# Patient Record
Sex: MALE | Race: WHITE | URBAN - METROPOLITAN AREA
[De-identification: names, ages, dates, MRNs, and addresses within clinical notes are randomized per-mention and may not be internally consistent; named-entity substitution may affect disease eponyms.]

---

## 2017-10-11 ENCOUNTER — TELEPHONE (OUTPATIENT)
Dept: LAB | Facility: CLINIC | Age: 72
End: 2017-10-11

## 2017-10-11 ENCOUNTER — OFFICE VISIT (OUTPATIENT)
Dept: FAMILY MEDICINE | Facility: CLINIC | Age: 72
End: 2017-10-11
Payer: COMMERCIAL

## 2017-10-11 VITALS
SYSTOLIC BLOOD PRESSURE: 160 MMHG | WEIGHT: 205 LBS | RESPIRATION RATE: 18 BRPM | HEART RATE: 96 BPM | BODY MASS INDEX: 29.35 KG/M2 | DIASTOLIC BLOOD PRESSURE: 76 MMHG | HEIGHT: 70 IN | TEMPERATURE: 98.5 F | OXYGEN SATURATION: 96 %

## 2017-10-11 DIAGNOSIS — M54.50 ACUTE LEFT-SIDED LOW BACK PAIN WITHOUT SCIATICA: Primary | ICD-10-CM

## 2017-10-11 DIAGNOSIS — I49.9 IRREGULAR HEART BEAT: ICD-10-CM

## 2017-10-11 PROCEDURE — 99213 OFFICE O/P EST LOW 20 MIN: CPT | Performed by: NURSE PRACTITIONER

## 2017-10-11 PROCEDURE — 93000 ELECTROCARDIOGRAM COMPLETE: CPT | Performed by: NURSE PRACTITIONER

## 2017-10-11 ASSESSMENT — PAIN SCALES - GENERAL: PAINLEVEL: NO PAIN (1)
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
## 2017-10-11 NOTE — MR AVS SNAPSHOT
After Visit Summary   10/11/2017    Eugenio Carroll    MRN: [id number]           Patient Information     Date Of Birth          1945        Visit Information        Provider Department      10/11/2017 1:40 PM Janay Cason NP [facility]        Today's Diagnoses     Irregular heart beat    -  1    Screen for colon cancer        Need for hepatitis C screening test        At risk for falling        Acute left-sided low back pain without sciatica          Care Instructions    Try topical Bengay or IcyHot 1-2x/days or few times a week  EKG was normal  We will call if labs are abnormal, otherwise will send a letter for normal labs.   Shoulder and Upper Back Stretch  To start, stand tall with your ears, shoulders, and hips in line. Your feet should be slightly apart, positioned just under your hips. Focus your eyes directly in front of you.  this position for a few seconds before starting your exercise. This helps increase your awareness of proper posture.          Reach overhead and slightly back with both arms. Keep your shoulders and neck aligned and your elbows behind your shoulders:    With your palms facing the ceiling, turn your fingers inward.    Take a deep breath. Breathe out, and lower your elbows toward your buttocks. Hold for 5 seconds, then return to starting position.    Repeat 3 times.  Date Last Reviewed: 8/16/2015 2000-2017 [employer]. [address] [place] [address], [place] [address]. All rights reserved. This information is not intended as a substitute for professional medical care. Always follow your healthcare professional's instructions.                Follow-ups after your visit        Who to contact     If you have questions or need follow up information about today's clinic visit or your schedule please contact [facility] directly at [phone].  Normal or non-critical lab and imaging results will be communicated to you by  "MyChart, letter or phone within 4 business days after the clinic has received the results. If you do not hear from us within 7 days, please contact the clinic through Viewexhart or phone. If you have a critical or abnormal lab result, we will notify you by phone as soon as possible.  Submit refill requests through CadenceMD or call your pharmacy and they will forward the refill request to us. Please allow 3 business days for your refill to be completed.          Additional Information About Your Visit        [facility] Information     CadenceMD lets you send messages to your doctor, view your test results, renew your prescriptions, schedule appointments and more. To sign up, go to [email or web address] . Click on \"Log in\" on the left side of the screen, which will take you to the Welcome page. Then click on \"Sign up Now\" on the right side of the page.     You will be asked to enter the access code listed below, as well as some personal information. Please follow the directions to create your username and password.     Your access code is: [email or web address]  Expires: 2018  2:23 PM     Your access code will  in 90 days. If you need help or a new code, please call your [place] clinic or [phone].        Care EveryWhere ID     This is your Care EveryWhere ID. This could be used by other organizations to access your [place] medical records  [id number]        Your Vitals Were     Pulse Temperature Respirations Height Pulse Oximetry BMI (Body Mass Index)    96 98.5  F (36.9  C) (Oral) 18 1.784 m (5' 10.25\") 96% 29.21 kg/m2       Blood Pressure from Last 3 Encounters:   10/11/17 160/76   09 130/70   07 124/66    Weight from Last 3 Encounters:   10/11/17 93 kg (205 lb)   09 94.8 kg (209 lb)   07 92.5 kg (204 lb)              We Performed the Following     Basic metabolic panel     EKG 12-lead complete w/read - Clinics     Hemoglobin     Magnesium        Primary Care Provider Office Phone # Fax # "    [facility] [phone]       [address] [facility] [address]        Equal Access to Services     TORRES ABAD : Hadii aad ku hadroseshilpi Thakkar, ramirezda beverleylowellha, averyta kageneda lobo, jacob pintomichelle paulinorahel riddle michael harper. So [facility] [phone].    ATENCIÓN: Si habla español, tiene a vallejo disposición servicios gratuitos de asistencia lingüística. Llame al [phone].    We comply with applicable federal civil rights laws and [place] laws. We do not discriminate on the basis of race, color, national origin, age, disability, sex, sexual orientation, or gender identity.            Thank you!     Thank you for choosing [facility]  for your care. Our goal is always to provide you with excellent care. Hearing back from our patients is one way we can continue to improve our services. Please take a few minutes to complete the written survey that you may receive in the mail after your visit with us. Thank you!             Your Updated Medication List - Protect others around you: Learn how to safely use, store and throw away your medicines at www.disposemymeds.org.      Notice  As of 10/11/2017  2:23 PM    You have not been prescribed any medications.

## 2017-10-11 NOTE — NURSING NOTE
"Chief Complaint   Patient presents with     Shoulder Pain       Initial /76  Pulse 96  Temp 98.5  F (36.9  C) (Oral)  Resp 18  Ht 1.784 m (5' 10.25\")  Wt 93 kg (205 lb)  SpO2 96%  BMI 29.21 kg/m2 Estimated body mass index is 29.21 kg/(m^2) as calculated from the following:    Height as of this encounter: 1.784 m (5' 10.25\").    Weight as of this encounter: 93 kg (205 lb).  Medication Reconciliation: complete     Juliet Alfaro MA       "

## 2017-10-11 NOTE — PATIENT INSTRUCTIONS
Try topical Bengay or IcyHot 1-2x/days or few times a week  EKG was normal  We will call if labs are abnormal, otherwise will send a letter for normal labs.   Shoulder and Upper Back Stretch  To start, stand tall with your ears, shoulders, and hips in line. Your feet should be slightly apart, positioned just under your hips. Focus your eyes directly in front of you.  this position for a few seconds before starting your exercise. This helps increase your awareness of proper posture.          Reach overhead and slightly back with both arms. Keep your shoulders and neck aligned and your elbows behind your shoulders:    With your palms facing the ceiling, turn your fingers inward.    Take a deep breath. Breathe out, and lower your elbows toward your buttocks. Hold for 5 seconds, then return to starting position.    Repeat 3 times.  Date Last Reviewed: 8/16/2015 2000-2017 [employer]. [address] [place] [address], [place] [address]. All rights reserved. This information is not intended as a substitute for professional medical care. Always follow your healthcare professional's instructions.

## 2017-10-11 NOTE — PATIENT INSTRUCTIONS
Order for BMP, MG, and Hemoglobin have been canceled and placed as  FUTURE orders in chart. Please call patient if you'd like for him to make a Lab Only appointment for to be drawn. Thank you, Nayla.

## 2017-10-13 DIAGNOSIS — M54.50 ACUTE LEFT-SIDED LOW BACK PAIN WITHOUT SCIATICA: ICD-10-CM

## 2017-10-13 LAB
ANION GAP SERPL CALCULATED.3IONS-SCNC: 7 MMOL/L (ref 3–14)
BUN SERPL-MCNC: 11 MG/DL (ref 7–30)
CALCIUM SERPL-MCNC: 9.1 MG/DL (ref 8.5–10.1)
CHLORIDE SERPL-SCNC: 104 MMOL/L (ref 94–109)
CO2 SERPL-SCNC: 29 MMOL/L (ref 20–32)
CREAT SERPL-MCNC: 0.9 MG/DL (ref 0.66–1.25)
GFR SERPL CREATININE-BSD FRML MDRD: 83 ML/MIN/1.7M2
GLUCOSE SERPL-MCNC: 99 MG/DL (ref 70–99)
HGB BLD-MCNC: 15.7 G/DL (ref 13.3–17.7)
MAGNESIUM SERPL-MCNC: 2 MG/DL (ref 1.6–2.3)
POTASSIUM SERPL-SCNC: 4.5 MMOL/L (ref 3.4–5.3)
SODIUM SERPL-SCNC: 140 MMOL/L (ref 133–144)

## 2017-10-13 PROCEDURE — 85018 HEMOGLOBIN: CPT | Performed by: NURSE PRACTITIONER

## 2017-10-13 PROCEDURE — 80048 BASIC METABOLIC PNL TOTAL CA: CPT | Performed by: NURSE PRACTITIONER

## 2017-10-13 PROCEDURE — 83735 ASSAY OF MAGNESIUM: CPT | Performed by: NURSE PRACTITIONER

## 2017-10-13 PROCEDURE — 36415 COLL VENOUS BLD VENIPUNCTURE: CPT | Performed by: NURSE PRACTITIONER

## 2017-11-01 ENCOUNTER — ALLIED HEALTH/NURSE VISIT (OUTPATIENT)
Dept: NURSING | Facility: CLINIC | Age: 72
End: 2017-11-01
Payer: COMMERCIAL

## 2017-11-01 ENCOUNTER — OFFICE VISIT (OUTPATIENT)
Dept: FAMILY MEDICINE | Facility: CLINIC | Age: 72
End: 2017-11-01
Payer: COMMERCIAL

## 2017-11-01 VITALS — DIASTOLIC BLOOD PRESSURE: 76 MMHG | SYSTOLIC BLOOD PRESSURE: 148 MMHG

## 2017-11-01 VITALS
DIASTOLIC BLOOD PRESSURE: 84 MMHG | WEIGHT: 207 LBS | SYSTOLIC BLOOD PRESSURE: 136 MMHG | TEMPERATURE: 97.7 F | HEART RATE: 83 BPM | BODY MASS INDEX: 29.49 KG/M2 | OXYGEN SATURATION: 97 %

## 2017-11-01 DIAGNOSIS — Z23 NEED FOR PROPHYLACTIC VACCINATION AND INOCULATION AGAINST INFLUENZA: Primary | ICD-10-CM

## 2017-11-01 DIAGNOSIS — H65.91 MUCOID OTITIS MEDIA OF RIGHT EAR, UNSPECIFIED CHRONICITY: Primary | ICD-10-CM

## 2017-11-01 PROCEDURE — 90662 IIV NO PRSV INCREASED AG IM: CPT

## 2017-11-01 PROCEDURE — 99213 OFFICE O/P EST LOW 20 MIN: CPT | Performed by: FAMILY MEDICINE

## 2017-11-01 PROCEDURE — G0008 ADMIN INFLUENZA VIRUS VAC: HCPCS

## 2017-11-01 PROCEDURE — 99207 ZZC NO CHARGE NURSE ONLY: CPT

## 2017-11-01 RX ORDER — AMOXICILLIN 500 MG/1
1000 TABLET, FILM COATED ORAL 2 TIMES DAILY
Qty: 40 TABLET | Refills: 0 | Status: SHIPPED | OUTPATIENT
Start: 2017-11-01 | End: 2017-11-11

## 2017-11-01 NOTE — PROGRESS NOTES
"  SUBJECTIVE:   Eugenio Carroll is a 72 year old male who presents to clinic today for the following health issues:        Ear Plugged      Duration: couple of weeks    Description (location/character/radiation): right ear    Intensity:  moderate    Accompanying signs and symptoms: plugged    History (similar episodes/previous evaluation): None    Precipitating or alleviating factors: None    Therapies tried and outcome: ear drops with warm water.       Pt has had his right ear plugged for the past 2 weeks and is having trouble hearing. He usually flushes out wax with warm water and [facility] When he blows his nose he hears a crackling in his ears. Having only slight ear pain. 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    Denies: cold, congestion,           Problem list and histories reviewed & adjusted, as indicated.  Additional history: as documented    Patient Active Problem List   Diagnosis     CARDIOVASCULAR SCREENING; LDL GOAL LESS THAN 160     Past Surgical History:   Procedure Laterality Date     C NONSPECIFIC PROCEDURE  '93    CATARACT EXTRACTION W/LENS IMPLANT -RIGHT EYE     C NONSPECIFIC PROCEDURE  '92    CATARACT EXTRACTION W/LENS IMPLANT -LEFT EYE       Social History   Substance Use Topics     Smoking status: Former Smoker     Quit date: 7/20/1969     Smokeless tobacco: Never Used      Comment: quit abt 1969     Alcohol use Yes     Family History   Problem Relation Age of Onset     CANCER Mother      uncertain of type     CANCER Sister      mouth & Lymph     Hypertension Brother          No current outpatient prescriptions on file.     Allergies   Allergen Reactions     No Known Allergies          Reviewed and updated as needed this visit by clinical staff     Reviewed and updated as needed this visit by Provider         ROS:  Constitutional, HEENT, " cardiovascular, pulmonary, gi and gu systems are negative, except as otherwise noted.      This document serves as a record of the services and decisions personally performed and made by Linette Walsh MD. It was created on her behalf by Charlotte Granados, a trained medical scribe. The creation of this document is based the provider's statements to the medical scribe.  Charlotte Granados November 1, 2017 4:52 PM      OBJECTIVE:   /84 (BP Location: Right arm, Patient Position: Sitting, Cuff Size: Adult Large)  Pulse 83  Temp 97.7  F (36.5  C) (Oral)  Wt 93.9 kg (207 lb)  SpO2 97%  BMI 29.49 kg/m2  Body mass index is 29.49 kg/(m^2).  GENERAL: healthy, alert and no distress, overweight  HENT: left ear canal and TM normal, Creamy looking fluid behind right TM, no redness, no pinna, mastoid tenderness  SKIN: no suspicious lesions or rashes to visible skin  PSYCH: mentation appears normal, affect normal/bright    Diagnostic Test Results:  No results found for this or any previous visit (from the past 24 hour(s)).    ASSESSMENT/PLAN:     1. Mucoid otitis media of right ear, unspecified chronicity  pt is to start taking amoxicillin 500 mg BID for 10 days. Reviewed timing of taking, onset, benefits, monitoring and typicall and severe AE of the medication. Reviewed symptomatic management of symptoms. Patient education provided, including expected course of illness and symptoms that may occur which would require urgent evalution. All questions answered. Patient understands and agrees with plan.  - amoxicillin (AMOXIL) 500 MG tablet; Take 2 tablets (1,000 mg) by mouth 2 times daily for 10 days  Dispense: 40 tablet; Refill: 0    See Patient Instructions  Start taking amoxicillin 500 mg 2x daily for 10 days.  If you do not have improvement in the next 5-6 days let me know and I will give you a referral to ENT.   If pain or hearing worsens get rechecked.     I would recommended taking a daily probiotic or eating  lenyurt while on the antibiotic      The information in this document, created by the medical scribe for me, accurately reflects the services I personally performed and the decisions made by me. I have reviewed and approved this document for accuracy.   MD Linette He MD  [facility]

## 2017-11-01 NOTE — NURSING NOTE
"Chief Complaint   Patient presents with     Ear Problem       Initial /84 (BP Location: Right arm, Patient Position: Sitting, Cuff Size: Adult Large)  Pulse 83  Temp 97.7  F (36.5  C) (Oral)  Wt 93.9 kg (207 lb)  SpO2 97%  BMI 29.49 kg/m2 Estimated body mass index is 29.49 kg/(m^2) as calculated from the following:    Height as of 10/11/17: 1.784 m (5' 10.25\").    Weight as of this encounter: 93.9 kg (207 lb).  Medication Reconciliation: complete   Rosibel DAHL      "

## 2017-11-01 NOTE — PATIENT INSTRUCTIONS
Start taking amoxicillin 500 mg 2x daily for 10 days.  If you do not have improvement in the next 5-6 days let me know and I will give you a referral.   If pain or hearing worsens get rechecked.     I would recommended taking a daily probiotic or eating yogurt while on the antibiotic (intake of the probiotic and antibiotic should be  by at least 2 hours) to help reduce the possible side effects of antibiotic    At [facility], we strive to deliver an exceptional experience to you, every time we see you.  If you receive a survey in the mail, please send us back your thoughts. We really do value your feedback.    Based on your medical history, these are the current health maintenance/preventive care services that you are due for (some may have been done at this visit.)  Health Maintenance Due   Topic Date Due     HEPATITIS C SCREENING  06/07/1963     ADVANCE DIRECTIVE PLANNING Q5 YRS  06/07/2000     FALL RISK ASSESSMENT  06/07/2010     PNEUMOCOCCAL (1 of 2 - PCV13) 06/07/2010     AORTIC ANEURYSM SCREENING (SYSTEM ASSIGNED)  06/07/2010     LIPID SCREEN Q5 YR MALE (SYSTEM ASSIGNED)  09/30/2014     COLONOSCOPY Q5 YR  12/09/2014     TETANUS IMMUNIZATION (SYSTEM ASSIGNED)  03/08/2017     INFLUENZA VACCINE (SYSTEM ASSIGNED)  09/01/2017         Suggested websites for health information:  Www.[place].org : Up to date and easily searchable information on multiple topics.  Www.medlineplus.gov : medication info, interactive tutorials, watch real surgeries online  Www.familydoctor.org : good info from the Academy of Family Physicians  Www.cdc.gov : public health info, travel advisories, epidemics (H1N1)  Www.aap.org : children's health info, normal development, vaccinations  Www.health.state.[place].us : [place] dept of health, public health issues in [place], N1N1    Your care team:     Family Medicine   LOUISA Maoyrga MD Emily Bunt, APRN CNP   S. MD Chaya Jimenez,  MD Linette Walsh MD         Clinic hours: Monday - Wednesday 7 am-7 pm   Thursdays and Fridays 7 am-5 pm.     [facility]: Monday - Friday 11 am-9 pm,   Saturday and Sunday 9 am-5 pm.    [facility]: Monday -Thursday 8 am-8 pm; Friday 8 am-6 pm; Saturday and Sunday 9 am-5 pm.     [facility]: Monday - Thursday 8 am - 7 pm; Friday 8 am - 6 pm    Clinic: [phone]   [facility]: [phone]   [facility]: [phone]

## 2017-11-01 NOTE — MR AVS SNAPSHOT
"              After Visit Summary   11/1/2017    Eugenio Carroll    MRN: [id number]           Patient Information     Date Of Birth          1945        Visit Information        Provider Department      11/1/2017 4:00 PM BA ANCILLARY [facility]        Today's Diagnoses     Need for prophylactic vaccination and inoculation against influenza    -  1       Follow-ups after your visit        Your next 10 appointments already scheduled     Nov 01, 2017  5:00 PM CDT   Office Visit with Linette Walsh MD   [facility] ([facility])    [address] [place] [id number] [phone]           Bring a current list of meds and any records pertaining to this visit. For Physicals, please bring immunization records and any forms needing to be filled out. Please arrive 10 minutes early to complete paperwork.              Who to contact     If you have questions or need follow up information about today's clinic visit or your schedule please contact [facility] directly at [phone].  Normal or non-critical lab and imaging results will be communicated to you by EcoDirecthart, letter or phone within 4 business days after the clinic has received the results. If you do not hear from us within 7 days, please contact the clinic through ReferralCandyt or phone. If you have a critical or abnormal lab result, we will notify you by phone as soon as possible.  Submit refill requests through Ikaria or call your pharmacy and they will forward the refill request to us. Please allow 3 business days for your refill to be completed.          Additional Information About Your Visit        EcoDirectharWaremakers Information     Ikaria lets you send messages to your doctor, view your test results, renew your prescriptions, schedule appointments and more. To sign up, go to [email or web address] . Click on \"Log in\" on the left side of the screen, which will take you to the " "Welcome page. Then click on \"Sign up Now\" on the right side of the page.     You will be asked to enter the access code listed below, as well as some personal information. Please follow the directions to create your username and password.     Your access code is: [email or web address]  Expires: 2018  2:23 PM     Your access code will  in 90 days. If you need help or a new code, please call your [place] clinic or [phone].        Care EveryWhere ID     This is your Care EveryWhere ID. This could be used by other organizations to access your [place] medical records  [id number]         Blood Pressure from Last 3 Encounters:   17 148/76   10/11/17 160/76   09 130/70    Weight from Last 3 Encounters:   10/11/17 93 kg (205 lb)   09 94.8 kg (209 lb)   07 92.5 kg (204 lb)              We Performed the Following     FLU VACCINE, INCREASED ANTIGEN, PRESV FREE, AGE 65+ [59178]     Vaccine Administration, Initial [45044]        Primary Care Provider Office Phone # Fax #    [facility] [phone] [address] [facility] [address]        Equal Access to Services     TORRES ABAD : Landon herrmanno Soomaali, waaxda luqadaha, qaybta kaalmada adeegyada, jacob harper. So [facility] [phone].    ATENCIÓN: Si habla español, tiene a vallejo disposición servicios gratuitos de asistencia lingüística. Llame al [phone].    We comply with applicable federal civil rights laws and [place] laws. We do not discriminate on the basis of race, color, national origin, age, disability, sex, sexual orientation, or gender identity.            Thank you!     Thank you for choosing [facility]  for your care. Our goal is always to provide you with excellent care. Hearing back from our patients is one way we can continue to improve our services. Please take a few minutes to complete the written survey that you may receive in the mail " after your visit with us. Thank you!             Your Updated Medication List - Protect others around you: Learn how to safely use, store and throw away your medicines at www.disposemymeds.org.      Notice  As of 11/1/2017  4:31 PM    You have not been prescribed any medications.

## 2017-11-01 NOTE — MR AVS SNAPSHOT
After Visit Summary   11/1/2017    Eugenio Carroll    MRN: [id number]           Patient Information     Date Of Birth          1945        Visit Information        Provider Department      11/1/2017 5:00 PM Linette Walsh MD [facility]        Today's Diagnoses     Mucoid otitis media of right ear, unspecified chronicity    -  1      Care Instructions    Start taking amoxicillin 10 mg 2x daily for 10 days.  If pain or hearing worsens get rechecked.     I would recommended taking a daily probiotic or eating yogurt while on the antibiotic (intake of the probiotic and antibiotic should be  by at least 2 hours) to help reduce the possible side effects of antibiotic    At [facility], we strive to deliver an exceptional experience to you, every time we see you.  If you receive a survey in the mail, please send us back your thoughts. We really do value your feedback.    Based on your medical history, these are the current health maintenance/preventive care services that you are due for (some may have been done at this visit.)  Health Maintenance Due   Topic Date Due     HEPATITIS C SCREENING  06/07/1963     ADVANCE DIRECTIVE PLANNING Q5 YRS  06/07/2000     FALL RISK ASSESSMENT  06/07/2010     PNEUMOCOCCAL (1 of 2 - PCV13) 06/07/2010     AORTIC ANEURYSM SCREENING (SYSTEM ASSIGNED)  06/07/2010     LIPID SCREEN Q5 YR MALE (SYSTEM ASSIGNED)  09/30/2014     COLONOSCOPY Q5 YR  12/09/2014     TETANUS IMMUNIZATION (SYSTEM ASSIGNED)  03/08/2017     INFLUENZA VACCINE (SYSTEM ASSIGNED)  09/01/2017         Suggested websites for health information:  Www.BugSense.The World of Pictures : Up to date and easily searchable information on multiple topics.  Www.medlineplus.gov : medication info, interactive tutorials, watch real surgeries online  Www.familydoctor.org : good info from the Academy of Family Physicians  Www.cdc.gov : public health info, travel advisories, epidemics  (H1N1)  Www.aap.org : children's health info, normal development, vaccinations  Www.health.[facility].[place].us : [place] dept of health, public health issues in [place], N1N1    Your care team:     Family Medicine   LOUISA Mayorga MD Emily Bunt, JERI BURNS   S. MD Chaya Jimenez MD Angela Wermerskirchen, MD         Clinic hours: Monday - Wednesday 7 am-7 pm   Thursdays and Fridays 7 am-5 pm.     [facility]: Monday - Friday 11 am-9 pm,   Saturday and Sunday 9 am-5 pm.    [facility]: Monday -Thursday 8 am-8 pm; Friday 8 am-6 pm; Saturday and Sunday 9 am-5 pm.     [facility]: Monday - Thursday 8 am - 7 pm; Friday 8 am - 6 pm    Clinic: [phone]   [facility]: [phone]   [facility]: [phone]          Follow-ups after your visit        Your next 10 appointments already scheduled     Nov 01, 2017  5:00 PM CDT   [facility]    [address] [address] [phone]           Bring a current list of meds and any records pertaining to this visit. For Physicals, please bring immunization records and any forms needing to be filled out. Please arrive 10 minutes early to complete paperwork.              Who to contact     If you have questions or need follow up information about today's clinic visit or your schedule please contact [facility] directly at [phone].  Normal or non-critical lab and imaging results will be communicated to you by MyChart, letter or phone within 4 business days after the clinic has received the results. If you do not hear from us within 7 days, please contact the clinic through MyChart or phone. If you have a critical or abnormal lab result, we will notify you by phone as soon as possible.  Submit refill requests through [employer] or  "call your pharmacy and they will forward the refill request to us. Please allow 3 business days for your refill to be completed.          Additional Information About Your Visit        MyChart Information     [employer] lets you send messages to your doctor, view your test results, renew your prescriptions, schedule appointments and more. To sign up, go to [email or web address] . Click on \"Log in\" on the left side of the screen, which will take you to the Welcome page. Then click on \"Sign up Now\" on the right side of the page.     You will be asked to enter the access code listed below, as well as some personal information. Please follow the directions to create your username and password.     Your access code is: [email or web address]  Expires: 2018  2:23 PM     Your access code will  in 90 days. If you need help or a new code, please call your [place] clinic or [phone].        Care EveryWhere ID     This is your Care EveryWhere ID. This could be used by other organizations to access your [place] medical records  [id number]        Your Vitals Were     Pulse Temperature Pulse Oximetry BMI (Body Mass Index)          83 97.7  F (36.5  C) (Oral) 97% 29.49 kg/m2         Blood Pressure from Last 3 Encounters:   17 136/84   17 148/76   10/11/17 160/76    Weight from Last 3 Encounters:   17 93.9 kg (207 lb)   10/11/17 93 kg (205 lb)   09 94.8 kg (209 lb)              Today, you had the following     No orders found for display         Today's Medication Changes          These changes are accurate as of: 17  4:58 PM.  If you have any questions, ask your nurse or doctor.               Start taking these medicines.        Dose/Directions    amoxicillin 500 MG tablet   Commonly known as:  AMOXIL   Used for:  Mucoid otitis media of right ear, unspecified chronicity   Started by:  Linette Walsh MD        Dose:  1000 mg   Take 2 tablets (1,000 mg) by mouth 2 times daily for 10 " days   Quantity:  40 tablet   Refills:  0            Where to get your medicines      These medications were sent to [facility]  #[id number] - [place], [place] - [address]  [address], [facility] [address]     Phone:  [phone]     amoxicillin 500 MG tablet                Primary Care Provider Office Phone # Fax #    [facility] [phone] [address] [address]        Equal Access to Services     TORRES ABAD : Hadii aad ku hadasho Soomaali, waaxda luqadaha, qaybta kaalmada adeegyada, waxay idiin hayaan adeeg kharash lastevie harper. So [facility] [phone].    ATENCIÓN: Si habla español, tiene a vallejo disposición servicios gratuitos de asistencia lingüística. [facility] [phone].    We comply with applicable federal civil rights laws and [place] laws. We do not discriminate on the basis of race, color, national origin, age, disability, sex, sexual orientation, or gender identity.            Thank you!     Thank you for choosing [facility]  for your care. Our goal is always to provide you with excellent care. Hearing back from our patients is one way we can continue to improve our services. Please take a few minutes to complete the written survey that you may receive in the mail after your visit with us. Thank you!             Your Updated Medication List - Protect others around you: Learn how to safely use, store and throw away your medicines at www.disposemymeds.org.          This list is accurate as of: 11/1/17  4:58 PM.  Always use your most recent med list.                   Brand Name Dispense Instructions for use Diagnosis    amoxicillin 500 MG tablet    AMOXIL    40 tablet    Take 2 tablets (1,000 mg) by mouth 2 times daily for 10 days    Mucoid otitis media of right ear, unspecified chronicity

## 2017-11-01 NOTE — PROGRESS NOTES

## 2017-11-02 ENCOUNTER — TELEPHONE (OUTPATIENT)
Dept: FAMILY MEDICINE | Facility: CLINIC | Age: 72
End: 2017-11-02

## 2017-11-02 NOTE — TELEPHONE ENCOUNTER
[id number] - [place], [place] - [address]    CORRECT PHARMACY FOR PATIENT. PLEASE UPDATE IN PATIENT'S CHART.

## 2017-11-07 ENCOUNTER — TELEPHONE (OUTPATIENT)
Dept: FAMILY MEDICINE | Facility: CLINIC | Age: 72
End: 2017-11-07

## 2017-11-07 DIAGNOSIS — H93.8X1 PLUGGED FEELING IN EAR, RIGHT: ICD-10-CM

## 2017-11-07 DIAGNOSIS — H92.01 RIGHT EAR PAIN: Primary | ICD-10-CM

## 2017-11-07 NOTE — TELEPHONE ENCOUNTER
Reason for Call:  Other Referral for ENT    Detailed comments: patient was seen 11/1/2017 for RT ear behind ear drum and was given medication and did not help and patient was told if this did not help Dr Walsh could give patient a referral to see an ENT. Patient would like to be referred.    Phone Number Patient can be reached at: Home number on file [phone] (home)    Best Time: any    Can we leave a detailed message on this number? no    Call taken on 11/7/2017 at 4:06 PM by Anita Ya

## 2017-11-21 ENCOUNTER — OFFICE VISIT (OUTPATIENT)
Dept: AUDIOLOGY | Facility: CLINIC | Age: 72
End: 2017-11-21

## 2017-11-21 ENCOUNTER — OFFICE VISIT (OUTPATIENT)
Dept: OTOLARYNGOLOGY | Facility: CLINIC | Age: 72
End: 2017-11-21
Payer: COMMERCIAL

## 2017-11-21 VITALS — BODY MASS INDEX: 29.63 KG/M2 | HEIGHT: 70 IN | RESPIRATION RATE: 12 BRPM | WEIGHT: 207 LBS

## 2017-11-21 DIAGNOSIS — H93.8X1 SENSATION OF FULLNESS IN RIGHT EAR: ICD-10-CM

## 2017-11-21 DIAGNOSIS — Z53.9 ERRONEOUS ENCOUNTER--DISREGARD: Primary | ICD-10-CM

## 2017-11-21 DIAGNOSIS — H93.8X1 EAR CANAL MASS, RIGHT: Primary | ICD-10-CM

## 2017-11-21 PROCEDURE — 99204 OFFICE O/P NEW MOD 45 MIN: CPT | Performed by: OTOLARYNGOLOGY

## 2017-11-21 NOTE — LETTER
11/21/2017         RE: Eugenio Carroll  [address] [place] [address]        Dear Colleague,    Thank you for referring your patient, Eugenio Carroll, to the [facility]. Please see a copy of my visit note below.    History of Present Illness - Eugenio Carroll is a 72 year old male here to see me for the first time due to RIGHT ear pain and fullness. This started suddenly a bout 6 or 7 weeks ago, when he started getting a dull ache in the RIGHT ear.  The hearing has changed where things sound echoing and he is hearing occasional crackles in the RIGHT ear.  The LEFT ear has longstanding issues.  He was in the , and the LEFT ear has had tinnitus for a long time, and his RIGHT ear has always been his 'good' ear.    No previous ear surgery or any ear disease at all.      Past Medical History -   Patient Active Problem List   Diagnosis     CARDIOVASCULAR SCREENING; LDL GOAL LESS THAN 160       Current Medications - No current outpatient prescriptions on file.    Allergies -   Allergies   Allergen Reactions     No Known Allergies        Social History -   Social History     Social History     Marital status:      Spouse name: Nydia     Number of children: 2     Years of education: 12     Occupational History     [employer] --General maintainence  worker       [employer]     Social History Main Topics     Smoking status: Former Smoker     Quit date: 7/20/1969     Smokeless tobacco: Never Used      Comment: quit abt 1969     Alcohol use Yes     Drug use: No     Sexual activity: Yes     Partners: Female     Other Topics Concern     Not on file     Social History Narrative       Family History -   Family History   Problem Relation Age of Onset     CANCER Mother      uncertain of type     CANCER Sister      mouth & Lymph     Hypertension Brother        Review of Systems - As per HPI and PMHx, otherwise 10+ system review of the head and neck, and general  "constitution is negative.    Physical Exam  Resp 12  Ht 1.778 m (5' 10\")  Wt 93.9 kg (207 lb)  BMI 29.7 kg/m2    General - The patient is well nourished and well developed, and appears to have good nutritional status.  Alert and oriented to person and place, answers questions and cooperates with examination appropriately.   Head and Face - Normocephalic and atraumatic, with no gross asymmetry noted of the contour of the facial features.  The facial nerve is intact, with strong symmetric movements.  Voice and Breathing - The patient was breathing comfortably without the use of accessory muscles. There was no wheezing, stridor, or stertor.  The patients voice was clear and strong, and had appropriate pitch and quality.  Ears - The LEFT tympanic membrane and canal are normal and healthy.  However, there is something unusual in the RIGHT canal.  Medial in the EAC, almost to the tympanic membrane or adjacent to it, there is a large, firm dark pink mass.  There was some debris on it, but under the microscope I was able to clean it off, and lightly palpate it.  It was firm, not ballotable, and did not bleed.  There was no pulsatile motion to it.  Eyes - Extraocular movements intact, and the pupils were reactive to light.  Sclera were not icteric or injected, conjunctiva were pink and moist.  Mouth - Examination of the oral cavity showed pink, healthy oral mucosa. No lesions or ulcerations noted.  The tongue was mobile and midline, and the dentition were in good condition.    Throat - The walls of the oropharynx were smooth, pink, moist, symmetric, and had no lesions or ulcerations.  The tonsillar pillars and soft palate were symmetric.  The uvula was midline on elevation.    Neck - Normal midline excursion of the laryngotracheal complex during swallowing.  Full range of motion on passive movement.  Palpation of the occipital, submental, submandibular, internal jugular chain, and supraclavicular nodes did not demonstrate " any abnormal lymph nodes or masses.  The carotid pulse was palpable bilaterally.  Palpation of the thyroid was soft and smooth, with no nodules or goiter appreciated.  The trachea was mobile and midline.  Nose - External contour is symmetric, no gross deflection or scars.  Nasal mucosa is pink and moist with no abnormal mucus.  The septum was midline and non-obstructive, turbinates of normal size and position.  No polyps, masses, or purulence noted on examination.      A/P - Eugenio Carroll is a 72 year old male  (H93.8X1) Ear canal mass, right  (primary encounter diagnosis)  (H93.8X1) Sensation of fullness in right ear    There is some fleshy red mass growing in the RIGHT EAC.  I am going to order a temporal bone CT to start the work up with.  And depending on its vascularity or site of origin, will direct next steps in treatment, or biopsy here in clinic.    Also, before any further work is done, we will need an audiogram, as the RIGHT ear is his better hearing ear.    Again, thank you for allowing me to participate in the care of your patient.        Sincerely,        Regan Juárez MD

## 2017-11-21 NOTE — NURSING NOTE
"Chief Complaint   Patient presents with     Consult     right ear pain       Initial Resp 12  Ht 1.778 m (5' 10\")  Wt 93.9 kg (207 lb)  BMI 29.7 kg/m2 Estimated body mass index is 29.7 kg/(m^2) as calculated from the following:    Height as of this encounter: 1.778 m (5' 10\").    Weight as of this encounter: 93.9 kg (207 lb).  Medication Reconciliation: complete     Carlos Glass CMA      "

## 2017-11-21 NOTE — MR AVS SNAPSHOT
After Visit Summary   11/21/2017    Eugenio Carroll    MRN: [id number]           Patient Information     Date Of Birth          1945        Visit Information        Provider Department      11/21/2017 12:00 PM Regan Juárez MD [facility]        Today's Diagnoses     Ear canal mass, right    -  1    Sensation of fullness in right ear          Care Instructions    Scheduling Information  To schedule your CT/MRI scan, please contact [facility] at [phone] OR [facility] at [phone]    To schedule your Surgery, please contact our Specialty Schedulers at [phone]      ENT Clinic Locations Clinic Hours Telephone Number     [facility]  [address]  [place] [address]   Monday:           1:00pm -- 5:00pm    Friday:              8:00am - 12:00pm   To schedule/reschedule an appointment with   Dr. Juárez,   please contact our   Specialty Scheduling Department at:     [phone]       [facility]  [address] [place] [address] Tuesday:          8:00am -- 2:00pm         Urgent Care Locations Clinic Hours Telephone Numbers     [facility]  [address] [place] [address]     Monday-Friday:     11:00am - 9:00pm    Saturday-Sunday:  9:00am - 5:00pm   [phone]     [facility]  [address] [place] [address]     Monday-Friday:      5:00pm - 9:00pm     Saturday-Sunday:  9:00am - 5:00pm   [phone]                 Follow-ups after your visit        Future tests that were ordered for you today     Open Future Orders        Priority Expected Expires Ordered    CT Temporal Orbital Sella w/o Contrast Routine  1/5/2018 11/21/2017            Who to contact     If you have questions or need follow up information about today's clinic visit or your schedule please contact [facility] directly at [phone].  Normal or non-critical lab and imaging results will be  "communicated to you by MyChart, letter or phone within 4 business days after the clinic has received the results. If you do not hear from us within 7 days, please contact the clinic through Parachute or phone. If you have a critical or abnormal lab result, we will notify you by phone as soon as possible.  Submit refill requests through Par[employer]ute or call your pharmacy and they will forward the refill request to us. Please allow 3 business days for your refill to be completed.          Additional Information About Your Visit        Parachute Information     Par[employer]ute lets you send messages to your doctor, view your test results, renew your prescriptions, schedule appointments and more. To sign up, go to [email or web address] . Click on \"Log in\" on the left side of the screen, which will take you to the Welcome page. Then click on \"Sign up Now\" on the right side of the page.     You will be asked to enter the access code listed below, as well as some personal information. Please follow the directions to create your username and password.     Your access code is: [email or web address]  Expires: 2018  1:23 PM     Your access code will  in 90 days. If you need help or a new code, please call your [facility] clinic or [phone].        Care EveryWhere ID     This is your Care EveryWhere ID. This could be used by other organizations to access your [place] medical records  [id number]        Your Vitals Were     Respirations Height BMI (Body Mass Index)             12 1.778 m (5' 10\") 29.7 kg/m2          Blood Pressure from Last 3 Encounters:   17 136/84   17 148/76   10/11/17 160/76    Weight from Last 3 Encounters:   17 93.9 kg (207 lb)   17 93.9 kg (207 lb)   10/11/17 93 kg (205 lb)               Primary Care Provider Office Phone # Fax #    [facility] [phone] [address] [address]        Equal Access to Services     TORRES ABAD AH: Landon stone " shawn Thakkar, lashonda greco, giannadenny weathersmaanatoly blackmallika, waxmaura promise hernandezkaiameli rodriguez meredith. So [facility] [phone].    ATENCIÓN: Si habla español, tiene a vallejo disposición servicios gratuitos de asistencia lingüística. Llame al [phone].    We comply with applicable federal civil rights laws and [place] laws. We do not discriminate on the basis of race, color, national origin, age, disability, sex, sexual orientation, or gender identity.            Thank you!     Thank you for choosing [facility]  for your care. Our goal is always to provide you with excellent care. Hearing back from our patients is one way we can continue to improve our services. Please take a few minutes to complete the written survey that you may receive in the mail after your visit with us. Thank you!             Your Updated Medication List - Protect others around you: Learn how to safely use, store and throw away your medicines at www.disposemymeds.org.      Notice  As of 11/21/2017 12:41 PM    You have not been prescribed any medications.

## 2017-11-21 NOTE — PATIENT INSTRUCTIONS
Scheduling Information  To schedule your CT/MRI scan, please contact [facility] at [phone] OR [facility] at [phone]    To schedule your Surgery, please contact our Specialty Schedulers at [phone]      ENT Clinic Locations Clinic Hours Telephone Number     [facility]  [address] [place] [address]   Monday:           1:00pm -- 5:00pm    Friday:              8:00am - 12:00pm   To schedule/reschedule an appointment with   Dr. Juárez,   please contact our   Specialty Scheduling Department at:     [phone]       [facility]  [address] [place] [address] Tuesday:          8:00am -- 2:00pm         Urgent Care Locations Clinic Hours Telephone Numbers     [facility]  [address] [place] [address]     Monday-Friday:     11:00am - 9:00pm    Saturday-Sunday:  9:00am - 5:00pm   [phone]     [facility]  [address] [place] [address]     Monday-Friday:      5:00pm - 9:00pm     Saturday-Sunday:  9:00am - 5:00pm   [phone]

## 2017-11-21 NOTE — PROGRESS NOTES
"History of Present Illness - Eugenio Carroll is a 72 year old male here to see me for the first time due to RIGHT ear pain and fullness. This started suddenly a bout 6 or 7 weeks ago, when he started getting a dull ache in the RIGHT ear.  The hearing has changed where things sound echoing and he is hearing occasional crackles in the RIGHT ear.  The LEFT ear has longstanding issues.  He was in the , and the LEFT ear has had tinnitus for a long time, and his RIGHT ear has always been his 'good' ear.    No previous ear surgery or any ear disease at all.      Past Medical History -   Patient Active Problem List   Diagnosis     CARDIOVASCULAR SCREENING; LDL GOAL LESS THAN 160       Current Medications - No current outpatient prescriptions on file.    Allergies -   Allergies   Allergen Reactions     No Known Allergies        Social History -   Social History     Social History     Marital status:      Spouse name: Nydia     Number of children: 2     Years of education: 12     Occupational History     GMW --General maintainence  worker       [employer]     Social History Main Topics     Smoking status: Former Smoker     Quit date: 7/20/1969     Smokeless tobacco: Never Used      Comment: quit abt 1969     Alcohol use Yes     Drug use: No     Sexual activity: Yes     Partners: Female     Other Topics Concern     Not on file     Social History Narrative       Family History -   Family History   Problem Relation Age of Onset     CANCER Mother      uncertain of type     CANCER Sister      mouth & Lymph     Hypertension Brother        Review of Systems - As per HPI and PMHx, otherwise 10+ system review of the head and neck, and general constitution is negative.    Physical Exam  Resp 12  Ht 1.778 m (5' 10\")  Wt 93.9 kg (207 lb)  BMI 29.7 kg/m2    General - The patient is well nourished and well developed, and appears to have good nutritional status.  Alert and oriented to person and place, " answers questions and cooperates with examination appropriately.   Head and Face - Normocephalic and atraumatic, with no gross asymmetry noted of the contour of the facial features.  The facial nerve is intact, with strong symmetric movements.  Voice and Breathing - The patient was breathing comfortably without the use of accessory muscles. There was no wheezing, stridor, or stertor.  The patients voice was clear and strong, and had appropriate pitch and quality.  Ears - The LEFT tympanic membrane and canal are normal and healthy.  However, there is something unusual in the RIGHT canal.  Medial in the EAC, almost to the tympanic membrane or adjacent to it, there is a large, firm dark pink mass.  There was some debris on it, but under the microscope I was able to clean it off, and lightly palpate it.  It was firm, not ballotable, and did not bleed.  There was no pulsatile motion to it.  Eyes - Extraocular movements intact, and the pupils were reactive to light.  Sclera were not icteric or injected, conjunctiva were pink and moist.  Mouth - Examination of the oral cavity showed pink, healthy oral mucosa. No lesions or ulcerations noted.  The tongue was mobile and midline, and the dentition were in good condition.    Throat - The walls of the oropharynx were smooth, pink, moist, symmetric, and had no lesions or ulcerations.  The tonsillar pillars and soft palate were symmetric.  The uvula was midline on elevation.    Neck - Normal midline excursion of the laryngotracheal complex during swallowing.  Full range of motion on passive movement.  Palpation of the occipital, submental, submandibular, internal jugular chain, and supraclavicular nodes did not demonstrate any abnormal lymph nodes or masses.  The carotid pulse was palpable bilaterally.  Palpation of the thyroid was soft and smooth, with no nodules or goiter appreciated.  The trachea was mobile and midline.  Nose - External contour is symmetric, no gross  deflection or scars.  Nasal mucosa is pink and moist with no abnormal mucus.  The septum was midline and non-obstructive, turbinates of normal size and position.  No polyps, masses, or purulence noted on examination.      A/P - Eugenio Carroll is a 72 year old male  (H93.8X1) Ear canal mass, right  (primary encounter diagnosis)  (H93.8X1) Sensation of fullness in right ear    There is some fleshy red mass growing in the RIGHT EAC.  I am going to order a temporal bone CT to start the work up with.  And depending on its vascularity or site of origin, will direct next steps in treatment, or biopsy here in clinic.    Also, before any further work is done, we will need an audiogram, as the RIGHT ear is his better hearing ear.

## 2017-12-07 ENCOUNTER — RADIANT APPOINTMENT (OUTPATIENT)
Dept: CT IMAGING | Facility: CLINIC | Age: 72
End: 2017-12-07
Attending: OTOLARYNGOLOGY
Payer: COMMERCIAL

## 2017-12-07 DIAGNOSIS — H93.8X1 EAR CANAL MASS, RIGHT: ICD-10-CM

## 2017-12-07 DIAGNOSIS — H93.8X1 SENSATION OF FULLNESS IN RIGHT EAR: Primary | ICD-10-CM

## 2017-12-07 LAB
CREAT BLD-MCNC: 0.9 MG/DL (ref 0.66–1.25)
GFR SERPL CREATININE-BSD FRML MDRD: 83 ML/MIN/1.7M2

## 2017-12-07 PROCEDURE — 36415 COLL VENOUS BLD VENIPUNCTURE: CPT | Performed by: INTERNAL MEDICINE

## 2017-12-07 PROCEDURE — 82565 ASSAY OF CREATININE: CPT | Performed by: INTERNAL MEDICINE

## 2017-12-07 PROCEDURE — 70481 CT ORBIT/EAR/FOSSA W/DYE: CPT | Performed by: RADIOLOGY

## 2017-12-07 RX ORDER — IOPAMIDOL 755 MG/ML
100 INJECTION, SOLUTION INTRAVASCULAR ONCE
Status: COMPLETED | OUTPATIENT
Start: 2017-12-07 | End: 2017-12-07

## 2017-12-07 RX ADMIN — IOPAMIDOL 100 ML: 755 INJECTION, SOLUTION INTRAVASCULAR at 10:57

## 2017-12-11 ENCOUNTER — TELEPHONE (OUTPATIENT)
Dept: OTOLARYNGOLOGY | Facility: CLINIC | Age: 72
End: 2017-12-11

## 2017-12-12 NOTE — TELEPHONE ENCOUNTER
Called and spoke with patient at length.  We will need a tissue diagnosis, and I will arrange for biopsy under sedation, as he would be very averse to doing this in the clinic under local.

## 2018-01-08 ENCOUNTER — OFFICE VISIT (OUTPATIENT)
Dept: OTOLARYNGOLOGY | Facility: CLINIC | Age: 73
End: 2018-01-08
Payer: COMMERCIAL

## 2018-01-08 ENCOUNTER — OFFICE VISIT (OUTPATIENT)
Dept: AUDIOLOGY | Facility: CLINIC | Age: 73
End: 2018-01-08
Payer: COMMERCIAL

## 2018-01-08 VITALS — BODY MASS INDEX: 29.78 KG/M2 | HEIGHT: 70 IN | WEIGHT: 208 LBS

## 2018-01-08 DIAGNOSIS — H90.3 SENSORINEURAL HEARING LOSS, BILATERAL: Primary | ICD-10-CM

## 2018-01-08 DIAGNOSIS — H71.91 CHOLESTEATOMA OF RIGHT EAR: Primary | ICD-10-CM

## 2018-01-08 RX ORDER — CIPROFLOXACIN HYDROCHLORIDE 3.5 MG/ML
SOLUTION/ DROPS TOPICAL
Qty: 1 BOTTLE | Refills: 0 | Status: SHIPPED | OUTPATIENT
Start: 2018-01-08

## 2018-01-08 RX ORDER — CIPROFLOXACIN AND DEXAMETHASONE 3; 1 MG/ML; MG/ML
SUSPENSION/ DROPS AURICULAR (OTIC)
Qty: 7.5 ML | Refills: 0 | Status: SHIPPED | OUTPATIENT
Start: 2018-01-08 | End: 2018-01-08

## 2018-01-08 RX ORDER — PREDNISOLONE ACETATE 10 MG/ML
SUSPENSION/ DROPS OPHTHALMIC
Qty: 1 BOTTLE | Refills: 0 | Status: SHIPPED | OUTPATIENT
Start: 2018-01-08 | End: 2018-01-18

## 2018-01-08 ASSESSMENT — PAIN SCALES - GENERAL: PAINLEVEL: NO PAIN (0)

## 2018-01-08 NOTE — MR AVS SNAPSHOT
After Visit Summary   1/8/2018    Eugenio Carroll    MRN: [id number]           Patient Information     Date Of Birth          1945        Visit Information        Provider Department      1/8/2018 11:00 AM [facility] Audiology        Today's Diagnoses     Sensorineural hearing loss, bilateral    -  1       Follow-ups after your visit        Your next 10 appointments already scheduled     Jan 08, 2018  1:00 PM CST   (Arrive by 12:45 PM)   NEW NEUROTOLOGY VISIT with MD GIORGIO Burns [facility] Nose [facility] Th[facility])    [address] [place] [id number] [phone]            Jul 09, 2018 10:45 AM CDT   (Arrive by 10:30 AM)   RETURN NEUROTOLOGY with MD GIORGIO Burns [facility]ose [facility] Th[facility])    [address] [place] [id number] [phone]              Who to contact     Please call your clinic at [phone] to:    Ask questions about your health    Make or cancel appointments    Discuss your medicines    Learn about your test results    Speak to your doctor   If you have compliments or concerns about an experience at your clinic, or if you wish to file a complaint, please contact [facility] Patient Relations at [phone] or email us at [email or web address]         Additional Information About Your Visit        MyChart Information     [employer] is an electronic gateway that provides easy, online access to your medical records. With [employer], you can request a clinic appointment, read your test results, renew a prescription or communicate with your care team.     To sign up for [employer] visit the website at [email or web address]   You will be asked to enter the access code listed below, as well as some personal information. Please follow the directions to create your username and password.     Your access  code is: [email or web address]  Expires: 2018  1:23 PM     Your access code will  in 90 days. If you need help or a new code, please contact your [facility] or call [phone] for assistance.        Care EveryWhere ID     This is your Care EveryWhere ID. This could be used by other organizations to access your [place] medical records  [id number]         Blood Pressure from Last 3 Encounters:   17 136/84   17 148/76   10/11/17 160/76    Weight from Last 3 Encounters:   17 93.9 kg (207 lb)   17 93.9 kg (207 lb)   10/11/17 93 kg (205 lb)              We Performed the Following     AUDIOGRAM/TYMPANOGRAM - INTERFACE     [facility] Audiometry Thrshld Eval & Speech Recog (92170)     Morgan   (18266)     Tymps / Reflex   (53870)          Today's Medication Changes          These changes are accurate as of: 18 12:45 PM.  If you have any questions, ask your nurse or doctor.               Start taking these medicines.        Dose/Directions    ciprofloxacin-dexamethasone otic suspension   Commonly known as:  CIPRODEX   Used for:  Otorrhea, right   Started by:  Deirdre Newton MD        Instill 5 drops the right ear twice a day for 7 days.   Quantity:  7.5 mL   Refills:  0            Where to get your medicines      These medications were sent to [id number] [address]  [address] [facility] [address]     Phone:  [phone]     ciprofloxacin-dexamethasone otic suspension                Primary Care Provider Office Phone # Fax #    [facility] [phone] [address] [facility] [address]        Equal Access to Services     TORRES ABAD AH: Landon Thakkar, lashonda greco, coleen kaalmajacob navarro. So [facility] [phone].    ATENCIÓN: Si habla español, tiene a vallejo disposición servicios gratuitos de asistencia lingüística. Llame al  [phone].    We comply with applicable federal civil rights laws and [place] laws. We do not discriminate on the basis of race, color, national origin, age, disability, sex, sexual orientation, or gender identity.            Thank you!     Thank you for choosing [facility] AUDIOLOGY  for your care. Our goal is always to provide you with excellent care. Hearing back from our patients is one way we can continue to improve our services. Please take a few minutes to complete the written survey that you may receive in the mail after your visit with us. Thank you!             Your Updated Medication List - Protect others around you: Learn how to safely use, store and throw away your medicines at www.disposemymeds.org.          This list is accurate as of: 1/8/18 12:45 PM.  Always use your most recent med list.                   Brand Name Dispense Instructions for use Diagnosis    ciprofloxacin-dexamethasone otic suspension    CIPRODEX    7.5 mL    Instill 5 drops the right ear twice a day for 7 days.    Otorrhea, right

## 2018-01-08 NOTE — PROGRESS NOTES
AUDIOLOGY REPORT    SUMMARY: Audiology visit completed. See audiogram for results.      RECOMMENDATIONS: Follow-up with ENT.    Bre Barone, [facility]  Licensed Audiologist  [place] License #[id number]

## 2018-01-08 NOTE — NURSING NOTE
Chief Complaint   Patient presents with     Consult     rt ear pain and fullness     Brea Perrin Medical Assistant

## 2018-01-08 NOTE — PROGRESS NOTES
Eugenio Carroll is seen in consultation from Dr. Juárez.  He is a 72 year old male being seen for a mass on the right tympanic membrane.  Mr. Carroll reports that he had been noticing progressive right sided hearing loss over the last year.  He has had a left hearing loss since he was in the service which is unchanged and he thinks he didn't notice the right loss because he was so used to the left loss.  He initially went in to his PCP who didn't think it was due to cerumen and he was sent to Dr. Juárez who did notice some cerumen impaction as well as a mass just above the TM.  He reports that after the ear was cleaned, his hearing immediately improved and has been fine since then.  There was never any otalgia, otorrhea or vertigo.  Dr. Juárez had contacted us regarding the mass with recommendations regarding biopsy versus removal and he is being seen today to discuss options.    Past Medical History:   Diagnosis Date     CATARACT        Past Surgical History:   Procedure Laterality Date     C NONSPECIFIC PROCEDURE  '93    CATARACT EXTRACTION W/LENS IMPLANT -RIGHT EYE     C NONSPECIFIC PROCEDURE  '92    CATARACT EXTRACTION W/LENS IMPLANT -LEFT EYE       Family History   Problem Relation Age of Onset     CANCER Mother      uncertain of type     CANCER Sister      mouth & Lymph     Hypertension Brother        Social History   Substance Use Topics     Smoking status: Former Smoker     Quit date: 7/20/1969     Smokeless tobacco: Never Used      Comment: quit abt 1969     Alcohol use Yes       Patient Supplied Answers to Review of Systems   ENT ROS 1/8/2018   Ears, Nose, Throat Hearing loss   The remainder of the 10 point review of systems is otherwise negative.    Physical examination:  Constitutional:  In no acute distress, appears stated age  Eyes:  Extraocular movements intact, no spontaneous nystagmus  Ears:  Both ears examined under the microscope.  Left ear canal clear, TM intact with a well aerated middle ear.  Right  ear canal still with a little cerumen posteriorly which was suctioned free, the ear canal skin and TM underneath this are intact.  There is also some cerumen anteriorly and when this was suctioned free, there is noted to be a finger of skin that is  from the anterior ear canal that is based superiorly near the head of the malleus, no obvious cholesteatoma noted underneath this but the skin overlying the superior TM is somewhat edematous and slightly erythematous but no mass noted within the middle ear, no otorrhea noted, some tenderness with suctioning on the finger of skin but otherwise no tenderness.  Respiratory:  No increased work of breathing, wheezing or stridor  Musculoskeletal:  Good upper extremity strength  Skin:  No rashes on the head and neck  Neurologic:  House Brackman 1/6 bilaterally, ambulating normally  Psychiatric:  Alert, normal affect, answering questions appropriately    Audiogram:  Right mild downsloping to mild/moderate sensorineural hearing loss with 100% speech discrimination, normal tympanogram and absent reflexes.  Left mild downsloping to severe/profound sensorineural hearing loss with 72% speech discrimination, normal tympanogram and intact reflexes.    CT:  Temporal bone CT was reviewed.  It was performed about 2 weeks after the ear was debrided.  Left mastoid and middle ear well aerated and developed, normal ossicular chain, intact otic capsule, facial nerve in its usual position.  Right mastoid well developed and aerated, middle ear aerated, the ear canal has opacification in the medial canal along the tympanic membrane and there is erosion anteriorly of the ear canal bone towards the glenoid fossa, there is opacification around the malleus as well but no scutal erosion or other bony erosion, otic capsule intact, facial nerve in its usual position.    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  The ear  canal has a finger of skin which has elevated off the canal but no bone exposure noted and no obvious middle ear mass although still with some edema of the TM and canal skin.  His hearing is back to baseline and there is no conductive loss noted.  Given that his right ear is his better hearing ear and his exam is not obviously concerning today, we discussed observation for now.  He may need surgical intervention if we cannot adequately debride the area in clinic but for now we will continue clinic debridement.  I'm going to place him on Ciprodex for the next week as well due to the edema that is noted and we'll see him back in 3 months.  He was in agreement with the plan.

## 2018-01-08 NOTE — LETTER
1/8/2018     RE: Eugenio Carroll  [address] [place] [address]     Dear Colleague,    Thank you for referring your patient, Eugenio Carroll, to the [facility] EAR NOSE [facility] THROAT at [facility]. Please see a copy of my visit note below.    Eugenio Carroll is seen in consultation from Dr. Juárez.  He is a 72 year old male being seen for a mass on the right tympanic membrane.  Mr. Carroll reports that he had been noticing progressive right sided hearing loss over the last year.  He has had a left hearing loss since he was in the service which is unchanged and he thinks he didn't notice the right loss because he was so used to the left loss.  He initially went in to his PCP who didn't think it was due to cerumen and he was sent to Dr. Juárez who did notice some cerumen impaction as well as a mass just above the TM.  He reports that after the ear was cleaned, his hearing immediately improved and has been fine since then.  There was never any otalgia, otorrhea or vertigo.  Dr. Juárez had contacted us regarding the mass with recommendations regarding biopsy versus removal and he is being seen today to discuss options.    Past Medical History:   Diagnosis Date     CATARACT        Past Surgical History:   Procedure Laterality Date     C NONSPECIFIC PROCEDURE  '93    CATARACT EXTRACTION W/LENS IMPLANT -RIGHT EYE     C NONSPECIFIC PROCEDURE  '92    CATARACT EXTRACTION W/LENS IMPLANT -LEFT EYE       Family History   Problem Relation Age of Onset     CANCER Mother      uncertain of type     CANCER Sister      mouth & Lymph     Hypertension Brother        Social History   Substance Use Topics     Smoking status: Former Smoker     Quit date: 7/20/1969     Smokeless tobacco: Never Used      Comment: quit abt 1969     Alcohol use Yes       Patient Supplied Answers to Review of Systems   ENT ROS 1/8/2018   Ears, Nose, Throat Hearing loss   The remainder of the 10 point review of systems  is otherwise negative.    Physical examination:  Constitutional:  In no acute distress, appears stated age  Eyes:  Extraocular movements intact, no spontaneous nystagmus  Ears:  Both ears examined under the microscope.  Left ear canal clear, TM intact with a well aerated middle ear.  Right ear canal still with a little cerumen posteriorly which was suctioned free, the ear canal skin and TM underneath this are intact.  There is also some cerumen anteriorly and when this was suctioned free, there is noted to be a finger of skin that is  from the anterior ear canal that is based superiorly near the head of the malleus, no obvious cholesteatoma noted underneath this but the skin overlying the superior TM is somewhat edematous and slightly erythematous but no mass noted within the middle ear, no otorrhea noted, some tenderness with suctioning on the finger of skin but otherwise no tenderness.  Respiratory:  No increased work of breathing, wheezing or stridor  Musculoskeletal:  Good upper extremity strength  Skin:  No rashes on the head and neck  Neurologic:  House Brackman 1/6 bilaterally, ambulating normally  Psychiatric:  Alert, normal affect, answering questions appropriately    Audiogram:  Right mild downsloping to mild/moderate sensorineural hearing loss with 100% speech discrimination, normal tympanogram and absent reflexes.  Left mild downsloping to severe/profound sensorineural hearing loss with 72% speech discrimination, normal tympanogram and intact reflexes.    CT:  Temporal bone CT was reviewed.  It was performed about 2 weeks after the ear was debrided.  Left mastoid and middle ear well aerated and developed, normal ossicular chain, intact otic capsule, facial nerve in its usual position.  Right mastoid well developed and aerated, middle ear aerated, the ear canal has opacification in the medial canal along the tympanic membrane and there is erosion anteriorly of the ear canal bone towards the  glenoid fossa, there is opacification around the malleus as well but no scutal erosion or other bony erosion, otic capsule intact, facial nerve in its usual position.    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  The ear canal has a finger of skin which has elevated off the canal but no bone exposure noted and no obvious middle ear mass although still with some edema of the TM and canal skin.  His hearing is back to baseline and there is no conductive loss noted.  Given that his right ear is his better hearing ear and his exam is not obviously concerning today, we discussed observation for now.  He may need surgical intervention if we cannot adequately debride the area in clinic but for now we will continue clinic debridement.  I'm going to place him on Ciprodex for the next week as well due to the edema that is noted and we'll see him back in 3 months.  He was in agreement with the plan.    Again, thank you for allowing me to participate in the care of your patient.      Sincerely,    Deirdre Newton MD

## 2018-01-08 NOTE — PATIENT INSTRUCTIONS
You will need  to schedule a follow up appointment in 3 months for an ear canal check.  Please  the prescription for ear drops at the pharmacy.   Please call our clinic for any questions,concerns,or worsening symptoms.      Clinic #[phone]       Option 3  for the triage nurse.

## 2018-01-08 NOTE — MR AVS SNAPSHOT
After Visit Summary   1/8/2018    Eugenio Carroll    MRN: [id number]           Patient Information     Date Of Birth          1945        Visit Information        Provider Department      1/8/2018 1:00 PM Deirdre Newton MD M [facility] Ear Nose and Throat        Today's Diagnoses     Otorrhea, right    -  1      Care Instructions    You will need  to schedule a follow up appointment in 6 months for an ear canal check.  Please  the prescription for ear drops at the pharmacy.   Please call our clinic for any questions,concerns,or worsening symptoms.      Clinic #[phone]       Option 3  for the triage nurse.          Follow-ups after your visit        Your next 10 appointments already scheduled     Jan 08, 2018  1:00 PM CST   (Arrive by 12:45 PM)   NEW NEUROTOLOGY VISIT with MD GIORGIO Burns [facility] Ear Nose and Throat ([facility])    [address] [place] [address] [phone]            Jul 09, 2018 10:45 AM CDT   (Arrive by 10:30 AM)   RETURN NEUROTOLOGY with MD GIORGIO Burns [facility] Ear Nose and Throat [facility])    [address] [place] [address] [phone]              Who to contact     Please call your clinic at [phone] to:    Ask questions about your health    Make or cancel appointments    Discuss your medicines    Learn about your test results    Speak to your doctor   If you have compliments or concerns about an experience at your clinic, or if you wish to file a complaint, please contact [facility] Patient Relations at [phone] or email us at [email or web address]         Additional Information About Your Visit        MyChart Information     Espinela is an electronic gateway that provides easy, online access to your medical records. With Espinela, you can request a clinic appointment, read your test results, renew a  "prescription or communicate with your care team.     To sign up for xaitmentt visit the website at [email or web address]   You will be asked to enter the access code listed below, as well as some personal information. Please follow the directions to create your username and password.     Your access code is: [email or web address]  Expires: 2018  1:23 PM     Your access code will  in 90 days. If you need help or a new code, please contact your [facility] or call [phone] for assistance.        Care EveryWhere ID     This is your Care EveryWhere ID. This could be used by other organizations to access your [place] medical records  [id number]        Your Vitals Were     Height BMI (Body Mass Index)                1.778 m (5' 10\") 29.84 kg/m2           Blood Pressure from Last 3 Encounters:   17 136/84   17 148/76   10/11/17 160/76    Weight from Last 3 Encounters:   18 94.3 kg (208 lb)   17 93.9 kg (207 lb)   17 93.9 kg (207 lb)              Today, you had the following     No orders found for display         Today's Medication Changes          These changes are accurate as of: 18 12:44 PM.  If you have any questions, ask your nurse or doctor.               Start taking these medicines.        Dose/Directions    ciprofloxacin-dexamethasone otic suspension   Commonly known as:  CIPRODEX   Used for:  Otorrhea, right   Started by:  Deirdre Newton MD        Instill 5 drops the right ear twice a day for 7 days.   Quantity:  7.5 mL   Refills:  0            Where to get your medicines      These medications were sent to [id number] - [place], [place] - [address]  [address] [facility] [address]     Phone:  [phone]     ciprofloxacin-dexamethasone otic suspension                Primary Care Provider Office Phone # Fax #    [facility] [phone] [address] [facility] [address]      "   Equal Access to Services     [facility] HospitalMIRANDA : Hadii aad ku hadroseshilpi Darleneali, wamaryda luqadaha, qabettyeta arturogenejacob navarro. So [facility] [phone].    ATENCIÓN: Si habla español, tiene a vallejo disposición servicios gratuitos de asistencia lingüística. Llame al [phone].    We comply with applicable federal civil rights laws and [place] laws. We do not discriminate on the basis of race, color, national origin, age, disability, sex, sexual orientation, or gender identity.            Thank you!     Thank you for choosing [facility] EAR NOSE AND TH[facility]AT  for your care. Our goal is always to provide you with excellent care. Hearing back from our patients is one way we can continue to improve our services. Please take a few minutes to complete the written survey that you may receive in the mail after your visit with us. Thank you!             Your Updated Medication List - Protect others around you: Learn how to safely use, store and throw away your medicines at www.disposemymeds.org.          This list is accurate as of: 1/8/18 12:44 PM.  Always use your most recent med list.                   Brand Name Dispense Instructions for use Diagnosis    ciprofloxacin-dexamethasone otic suspension    CIPRODEX    7.5 mL    Instill 5 drops the right ear twice a day for 7 days.    Otorrhea, right

## 2018-01-15 ENCOUNTER — TELEPHONE (OUTPATIENT)
Dept: OTOLARYNGOLOGY | Facility: CLINIC | Age: 73
End: 2018-01-15

## 2018-01-15 NOTE — TELEPHONE ENCOUNTER
I called and spoke with patient and informed him of Dr. Juárez's message. Patient verbalized understanding to me and did cancel appointment with Dr. Newton to continue further care with Dr. Juárez locally.    Jojo Weeks, CMA

## 2018-01-15 NOTE — TELEPHONE ENCOUNTER
----- Message from Regan Juárez MD sent at 1/15/2018 10:12 AM CST -----  Regarding: patient follow up  Please call patient, and let him know that I got Dr. Newton' note about his ear, and happy to hear that he won't need surgery.  Please let him know that he also has the option of coming back to see me in three months for follow up, instead of having to go back downtown for follow up in three months.  Please help him schedule that follow up appointment if he would like to stay local.  Thanks.

## 2018-05-15 ENCOUNTER — OFFICE VISIT (OUTPATIENT)
Dept: OTOLARYNGOLOGY | Facility: CLINIC | Age: 73
End: 2018-05-15
Payer: COMMERCIAL

## 2018-05-15 VITALS
BODY MASS INDEX: 29.78 KG/M2 | SYSTOLIC BLOOD PRESSURE: 151 MMHG | HEIGHT: 70 IN | WEIGHT: 208 LBS | DIASTOLIC BLOOD PRESSURE: 96 MMHG | OXYGEN SATURATION: 93 % | HEART RATE: 93 BPM | RESPIRATION RATE: 12 BRPM

## 2018-05-15 DIAGNOSIS — H93.8X1 EAR CANAL MASS, RIGHT: Primary | ICD-10-CM

## 2018-05-15 PROCEDURE — 99214 OFFICE O/P EST MOD 30 MIN: CPT | Performed by: OTOLARYNGOLOGY

## 2018-05-15 NOTE — MR AVS SNAPSHOT
After Visit Summary   5/15/2018    Eugenio Carroll    MRN: [id number]           Patient Information     Date Of Birth          1945        Visit Information        Provider Department      5/15/2018 1:15 PM Regan Juárez MD [facility]        Today's Diagnoses     Ear canal mass, right    -  1      Care Instructions    Scheduling Information  To schedule your CT/MRI scan, please contact [facility] at [phone] OR [facility] at [phone]    To schedule your Surgery, please contact our Specialty Schedulers at [phone]      ENT Clinic Locations Clinic Hours Telephone Number     [facility]  [address] [facility] [place] [address]   Monday:           1:00pm -- 5:00pm    Friday:              8:00am - 12:00pm   To schedule/reschedule an appointment with   Dr. Juárez,   please contact our   Specialty Scheduling Department at:     [phone]       [facility]  [address] [place] [address] Tuesday:          8:00am -- 2:00pm         Urgent Care Locations Clinic Hours Telephone Numbers     [facility]  [address] [place] [address]     Monday-Friday:     11:00am - 9:00pm    Saturday-Sunday:  9:00am - 5:00pm   [phone]     [facility]  [address] [place] [address]     Monday-Friday:      5:00pm - 9:00pm     Saturday-Sunday:  9:00am - 5:00pm   [phone]                 Follow-ups after your visit        Your next 10 appointments already scheduled     Jul 09, 2018 10:45 AM CDT   (Arrive by 10:30 AM)   RETURN NEUROTOLOGY with Deirdre Newton MD   [facility] Nose [facility] ([facility] [place])    [address] [place] [address] [phone]              Who to contact     If you have questions or need follow up information about today's clinic visit or your schedule please contact [facility] directly at  "[phone].  Normal or non-critical lab and imaging results will be communicated to you by MyChart, letter or phone within 4 business days after the clinic has received the results. If you do not hear from us within 7 days, please contact the clinic through Catbirdhart or phone. If you have a critical or abnormal lab result, we will notify you by phone as soon as possible.  Submit refill requests through TourNative or call your pharmacy and they will forward the refill request to us. Please allow 3 business days for your refill to be completed.          Additional Information About Your Visit        Catbirdhart Information     TourNative lets you send messages to your doctor, view your test results, renew your prescriptions, schedule appointments and more. To sign up, go to [email or web address] . Click on \"Log in\" on the left side of the screen, which will take you to the Welcome page. Then click on \"Sign up Now\" on the right side of the page.     You will be asked to enter the access code listed below, as well as some personal information. Please follow the directions to create your username and password.     Your access code is: [email or web address]  Expires: 2018  1:19 PM     Your access code will  in 90 days. If you need help or a new code, please call your [place] clinic or [phone].        Care EveryWhere ID     This is your Care EveryWhere ID. This could be used by other organizations to access your [place] medical records  [id number]        Your Vitals Were     Pulse Respirations Height Pulse Oximetry BMI (Body Mass Index)       93 12 1.778 m (5' 10\") 93% 29.84 kg/m2        Blood Pressure from Last 3 Encounters:   05/15/18 (!) 151/96   17 136/84   17 148/76    Weight from Last 3 Encounters:   05/15/18 94.3 kg (208 lb)   18 94.3 kg (208 lb)   17 93.9 kg (207 lb)              Today, you had the following     No orders found for display       Primary Care Provider Office Phone # Fax # "    [facility] [phone]       [address] [facility] [address]        Equal Access to Services     TORRES ABAD : Hadii aad ku hadroseshilpi Soprema, wamaryda luqadaha, qabettyeta kaalmada lobo, jacob pintomichelle paulinorahel riddle michael harper. So [facility] [phone].    ATENCIÓN: Si habla español, tiene a vallejo disposición servicios gratuitos de asistencia lingüística. Llame al [phone].    We comply with applicable federal civil rights laws and [place] laws. We do not discriminate on the basis of race, color, national origin, age, disability, sex, sexual orientation, or gender identity.            Thank you!     Thank you for choosing [facility]  for your care. Our goal is always to provide you with excellent care. Hearing back from our patients is one way we can continue to improve our services. Please take a few minutes to complete the written survey that you may receive in the mail after your visit with us. Thank you!             Your Updated Medication List - Protect others around you: Learn how to safely use, store and throw away your medicines at www.disposemymeds.org.          This list is accurate as of 5/15/18  1:19 PM.  Always use your most recent med list.                   Brand Name Dispense Instructions for use Diagnosis    ciprofloxacin 0.3 % ophthalmic solution    CILOXAN    1 Bottle    4 drops twice a day to the right EAR for one week    Cholesteatoma of right ear

## 2018-05-15 NOTE — PROGRESS NOTES
History of Present Illness - Eugenio Carroll is a 72 year old male last seen on 11/21/2017.    To review, he was sent to me for RIGHT ear pain, and this started suddenly a bout 6 or 7 weeks prior to presentation, when he started getting a dull ache in the RIGHT ear.  The hearing has changed where things sound echoing and he is hearing occasional crackles in the RIGHT ear.  The LEFT ear has longstanding issues.  He was in the , and the LEFT ear has had tinnitus for a long time, and his RIGHT ear has always been his 'good' ear.    On exam, I found a very unusual soft tissue mass adjacent to the annulus.  A temporal bone CT showed possible extension under the annulus and into the middle ear, and so I referred to otology.  Dr. Deirdre Newton saw him on 1/8/2018.  But interestingly, after I debrided out his canal, his symptoms resolved.  Watchful waiting was agreed upon, and he is here to see me in follow up.  He has had absolutely no symptoms since seeing me last time.    Past Medical History -   Patient Active Problem List   Diagnosis     CARDIOVASCULAR SCREENING; LDL GOAL LESS THAN 160       Current Medications -   Current Outpatient Prescriptions:      ciprofloxacin (CILOXAN) 0.3 % ophthalmic solution, 4 drops twice a day to the right EAR for one week, Disp: 1 Bottle, Rfl: 0    Allergies -   Allergies   Allergen Reactions     No Known Allergies        Social History -   Social History     Social History     Marital status:      Spouse name: Nydia     Number of children: 2     Years of education: 12     Occupational History     GMW --General maintainence  worker       [employer]     Social History Main Topics     Smoking status: Former Smoker     Quit date: 7/20/1969     Smokeless tobacco: Never Used      Comment: quit abt 1969     Alcohol use Yes     Drug use: No     Sexual activity: Yes     Partners: Female     Other Topics Concern     Not on file     Social History Narrative  "      Family History -   Family History   Problem Relation Age of Onset     CANCER Mother      uncertain of type     CANCER Sister      mouth & Lymph     Hypertension Brother        Review of Systems - As per HPI and PMHx, otherwise 10+ system review of the head and neck, and general constitution is negative.    Physical Exam  BP (!) 151/96  Pulse 93  Resp 12  Ht 1.778 m (5' 10\")  Wt 94.3 kg (208 lb)  SpO2 93%  BMI 29.84 kg/m2    General - The patient is well nourished and well developed, and appears to have good nutritional status.  Alert and oriented to person and place, answers questions and cooperates with examination appropriately.   Head and Face - Normocephalic and atraumatic, with no gross asymmetry noted of the contour of the facial features.  The facial nerve is intact, with strong symmetric movements.  Voice and Breathing - The patient was breathing comfortably without the use of accessory muscles. There was no wheezing, stridor, or stertor.  The patients voice was clear and strong, and had appropriate pitch and quality.  Ears - Exam was done with the binocular microscope. The tympanic membranes are normal in appearance, bony landmarks are intact.  No retraction, perforation, or masses.  No fluid or purulence was seen in the external canal or the middle ear. No evidence of infection of the middle ear or external canal, cerumen was normal in appearance.  Eyes - Extraocular movements intact, and the pupils were reactive to light.  Sclera were not icteric or injected, conjunctiva were pink and moist.        A/P - Eugenio Carroll is a 72 year old male  (H93.8X1) Ear canal mass, right  (primary encounter diagnosis)    The canals and tympanic membrane's are totally normal.  I can only assume that this was some inadvertant pushing of debris onto the RIGHT tympanic membrane that cause some reversible hearing loss and edema.  And now it is completely resolved.    Follow up as needed.  "

## 2018-05-15 NOTE — LETTER
5/15/2018         RE: Eugenio Carroll  [address] [place] [address]        Dear Colleague,    Thank you for referring your patient, Eugenio Carroll, to the [facility]. Please see a copy of my visit note below.    History of Present Illness - Eugenio Carroll is a 72 year old male last seen on 11/21/2017.    To review, he was sent to me for RIGHT ear pain, and this started suddenly a bout 6 or 7 weeks prior to presentation, when he started getting a dull ache in the RIGHT ear.  The hearing has changed where things sound echoing and he is hearing occasional crackles in the RIGHT ear.  The LEFT ear has longstanding issues.  He was in the , and the LEFT ear has had tinnitus for a long time, and his RIGHT ear has always been his 'good' ear.    On exam, I found a very unusual soft tissue mass adjacent to the annulus.  A temporal bone CT showed possible extension under the annulus and into the middle ear, and so I referred to otology.  Dr. Deirdre Newton saw him on 1/8/2018.  But interestingly, after I debrided out his canal, his symptoms resolved.  Watchful waiting was agreed upon, and he is here to see me in follow up.  He has had absolutely no symptoms since seeing me last time.    Past Medical History -   Patient Active Problem List   Diagnosis     CARDIOVASCULAR SCREENING; LDL GOAL LESS THAN 160       Current Medications -   Current Outpatient Prescriptions:      ciprofloxacin (CILOXAN) 0.3 % ophthalmic solution, 4 drops twice a day to the right EAR for one week, Disp: 1 Bottle, Rfl: 0    Allergies -   Allergies   Allergen Reactions     No Known Allergies        Social History -   Social History     Social History     Marital status:      Spouse name: Nydia     Number of children: 2     Years of education: 12     Occupational History     GMW --General maintainence  worker       [employer]     Social History Main Topics     Smoking status: Former  "Smoker     Quit date: 7/20/1969     Smokeless tobacco: Never Used      Comment: quit abt 1969     Alcohol use Yes     Drug use: No     Sexual activity: Yes     Partners: Female     Other Topics Concern     Not on file     Social History Narrative       Family History -   Family History   Problem Relation Age of Onset     CANCER Mother      uncertain of type     CANCER Sister      mouth & Lymph     Hypertension Brother        Review of Systems - As per HPI and PMHx, otherwise 10+ system review of the head and neck, and general constitution is negative.    Physical Exam  BP (!) 151/96  Pulse 93  Resp 12  Ht 1.778 m (5' 10\")  Wt 94.3 kg (208 lb)  SpO2 93%  BMI 29.84 kg/m2    General - The patient is well nourished and well developed, and appears to have good nutritional status.  Alert and oriented to person and place, answers questions and cooperates with examination appropriately.   Head and Face - Normocephalic and atraumatic, with no gross asymmetry noted of the contour of the facial features.  The facial nerve is intact, with strong symmetric movements.  Voice and Breathing - The patient was breathing comfortably without the use of accessory muscles. There was no wheezing, stridor, or stertor.  The patients voice was clear and strong, and had appropriate pitch and quality.  Ears - Exam was done with the binocular microscope. The tympanic membranes are normal in appearance, bony landmarks are intact.  No retraction, perforation, or masses.  No fluid or purulence was seen in the external canal or the middle ear. No evidence of infection of the middle ear or external canal, cerumen was normal in appearance.  Eyes - Extraocular movements intact, and the pupils were reactive to light.  Sclera were not icteric or injected, conjunctiva were pink and moist.        A/P - Eugenio Carroll is a 72 year old male  (H93.8X1) Ear canal mass, right  (primary encounter diagnosis)    The canals and tympanic membrane's are " totally normal.  I can only assume that this was some inadvertant pushing of debris onto the RIGHT tympanic membrane that cause some reversible hearing loss and edema.  And now it is completely resolved.    Follow up as needed.    Again, thank you for allowing me to participate in the care of your patient.        Sincerely,        Regan Juárez MD

## 2018-05-15 NOTE — PATIENT INSTRUCTIONS
Scheduling Information  To schedule your CT/MRI scan, please contact [facility] at [phone] OR [facility] at [phone]    To schedule your Surgery, please contact our Specialty Schedulers at [phone]      ENT Clinic Locations Clinic Hours Telephone Number     [facility]  [address] [place] [address]   Monday:           1:00pm -- 5:00pm    Friday:              8:00am - 12:00pm   To schedule/reschedule an appointment with   Dr. Juárez,   please contact our   Specialty Scheduling Department at:     [phone]       [facility]  [address] [place] [address] Tuesday:          8:00am -- 2:00pm         Urgent Care Locations Clinic Hours Telephone Numbers     [facility]  [address] [place] [address]     Monday-Friday:     11:00am - 9:00pm    Saturday-Sunday:  9:00am - 5:00pm   [phone]     [facility]  [address] [place] [address]     Monday-Friday:      5:00pm - 9:00pm     Saturday-Sunday:  9:00am - 5:00pm   [phone]
